# Patient Record
Sex: FEMALE | Race: WHITE | NOT HISPANIC OR LATINO | Employment: OTHER | URBAN - METROPOLITAN AREA
[De-identification: names, ages, dates, MRNs, and addresses within clinical notes are randomized per-mention and may not be internally consistent; named-entity substitution may affect disease eponyms.]

---

## 2025-02-04 DIAGNOSIS — M21.622 BUNIONETTE OF LEFT FOOT: ICD-10-CM

## 2025-02-26 ENCOUNTER — PRE-ADMISSION TESTING (OUTPATIENT)
Dept: PREADMISSION TESTING | Facility: HOSPITAL | Age: 75
End: 2025-02-26
Payer: MEDICARE

## 2025-02-26 VITALS
TEMPERATURE: 98.4 F | DIASTOLIC BLOOD PRESSURE: 93 MMHG | RESPIRATION RATE: 20 BRPM | HEIGHT: 62 IN | SYSTOLIC BLOOD PRESSURE: 162 MMHG | WEIGHT: 177.25 LBS | BODY MASS INDEX: 32.62 KG/M2 | OXYGEN SATURATION: 96 % | HEART RATE: 61 BPM

## 2025-02-26 DIAGNOSIS — M21.372 LEFT FOOT DROP: ICD-10-CM

## 2025-02-26 DIAGNOSIS — G89.29 CHRONIC PAIN IN LEFT FOOT: ICD-10-CM

## 2025-02-26 DIAGNOSIS — M21.622 BUNIONETTE OF LEFT FOOT: ICD-10-CM

## 2025-02-26 DIAGNOSIS — I10 PRIMARY HYPERTENSION: ICD-10-CM

## 2025-02-26 DIAGNOSIS — Z01.818 PREOP TESTING: Primary | ICD-10-CM

## 2025-02-26 DIAGNOSIS — M79.672 CHRONIC PAIN IN LEFT FOOT: ICD-10-CM

## 2025-02-26 LAB
ALBUMIN SERPL BCP-MCNC: 4.5 G/DL (ref 3.4–5)
ALP SERPL-CCNC: 58 U/L (ref 33–136)
ALT SERPL W P-5'-P-CCNC: 18 U/L (ref 7–45)
AMPHETAMINES UR QL SCN: NORMAL
ANION GAP SERPL CALC-SCNC: 12 MMOL/L (ref 10–20)
AST SERPL W P-5'-P-CCNC: 22 U/L (ref 9–39)
BARBITURATES UR QL SCN: NORMAL
BASOPHILS # BLD AUTO: 0.05 X10*3/UL (ref 0–0.1)
BASOPHILS NFR BLD AUTO: 1 %
BENZODIAZ UR QL SCN: NORMAL
BILIRUB SERPL-MCNC: 0.4 MG/DL (ref 0–1.2)
BUN SERPL-MCNC: 12 MG/DL (ref 6–23)
BZE UR QL SCN: NORMAL
CALCIUM SERPL-MCNC: 9.6 MG/DL (ref 8.6–10.3)
CANNABINOIDS UR QL SCN: NORMAL
CHLORIDE SERPL-SCNC: 105 MMOL/L (ref 98–107)
CO2 SERPL-SCNC: 27 MMOL/L (ref 21–32)
CREAT SERPL-MCNC: 0.75 MG/DL (ref 0.5–1.05)
EGFRCR SERPLBLD CKD-EPI 2021: 83 ML/MIN/1.73M*2
EOSINOPHIL # BLD AUTO: 0.3 X10*3/UL (ref 0–0.4)
EOSINOPHIL NFR BLD AUTO: 5.8 %
ERYTHROCYTE [DISTWIDTH] IN BLOOD BY AUTOMATED COUNT: 13.4 % (ref 11.5–14.5)
FENTANYL+NORFENTANYL UR QL SCN: NORMAL
GLUCOSE SERPL-MCNC: 88 MG/DL (ref 74–99)
HCT VFR BLD AUTO: 41.3 % (ref 36–46)
HGB BLD-MCNC: 13.7 G/DL (ref 12–16)
IMM GRANULOCYTES # BLD AUTO: 0.01 X10*3/UL (ref 0–0.5)
IMM GRANULOCYTES NFR BLD AUTO: 0.2 % (ref 0–0.9)
LYMPHOCYTES # BLD AUTO: 1.86 X10*3/UL (ref 0.8–3)
LYMPHOCYTES NFR BLD AUTO: 36.3 %
MCH RBC QN AUTO: 32 PG (ref 26–34)
MCHC RBC AUTO-ENTMCNC: 33.2 G/DL (ref 32–36)
MCV RBC AUTO: 97 FL (ref 80–100)
METHADONE UR QL SCN: NORMAL
MONOCYTES # BLD AUTO: 0.41 X10*3/UL (ref 0.05–0.8)
MONOCYTES NFR BLD AUTO: 8 %
NEUTROPHILS # BLD AUTO: 2.5 X10*3/UL (ref 1.6–5.5)
NEUTROPHILS NFR BLD AUTO: 48.7 %
NRBC BLD-RTO: 0 /100 WBCS (ref 0–0)
OPIATES UR QL SCN: NORMAL
OXYCODONE+OXYMORPHONE UR QL SCN: NORMAL
PCP UR QL SCN: NORMAL
PLATELET # BLD AUTO: 223 X10*3/UL (ref 150–450)
POTASSIUM SERPL-SCNC: 4.2 MMOL/L (ref 3.5–5.3)
PROT SERPL-MCNC: 7.2 G/DL (ref 6.4–8.2)
RBC # BLD AUTO: 4.28 X10*6/UL (ref 4–5.2)
SODIUM SERPL-SCNC: 140 MMOL/L (ref 136–145)
WBC # BLD AUTO: 5.1 X10*3/UL (ref 4.4–11.3)

## 2025-02-26 PROCEDURE — 93005 ELECTROCARDIOGRAM TRACING: CPT

## 2025-02-26 PROCEDURE — 85025 COMPLETE CBC W/AUTO DIFF WBC: CPT | Performed by: PODIATRIST

## 2025-02-26 PROCEDURE — 84075 ASSAY ALKALINE PHOSPHATASE: CPT | Performed by: PODIATRIST

## 2025-02-26 PROCEDURE — 80307 DRUG TEST PRSMV CHEM ANLYZR: CPT

## 2025-02-26 PROCEDURE — 87081 CULTURE SCREEN ONLY: CPT | Mod: PARLAB

## 2025-02-26 PROCEDURE — 99204 OFFICE O/P NEW MOD 45 MIN: CPT | Performed by: NURSE PRACTITIONER

## 2025-02-26 RX ORDER — METFORMIN HYDROCHLORIDE 500 MG/1
500 TABLET ORAL
COMMUNITY

## 2025-02-26 RX ORDER — IRON POLYSACCHARIDE COMPLEX 150 MG
150 CAPSULE ORAL 3 TIMES WEEKLY
COMMUNITY

## 2025-02-26 RX ORDER — CYANOCOBALAMIN 1000 UG/ML
1000 INJECTION, SOLUTION INTRAMUSCULAR; SUBCUTANEOUS
COMMUNITY

## 2025-02-26 RX ORDER — OMEPRAZOLE 40 MG/1
40 CAPSULE, DELAYED RELEASE ORAL DAILY
COMMUNITY

## 2025-02-26 RX ORDER — VITAMIN B COMPLEX
1 CAPSULE ORAL DAILY
COMMUNITY

## 2025-02-26 RX ORDER — CHLORHEXIDINE GLUCONATE 40 MG/ML
SOLUTION TOPICAL DAILY
Qty: 118 ML | Refills: 0 | Status: SHIPPED | OUTPATIENT
Start: 2025-02-26 | End: 2025-03-03

## 2025-02-26 RX ORDER — DOXAZOSIN 2 MG/1
2 TABLET ORAL 2 TIMES DAILY
COMMUNITY

## 2025-02-26 RX ORDER — LECITHIN 1200 MG
1000 CAPSULE ORAL DAILY
COMMUNITY

## 2025-02-26 RX ORDER — CHLORHEXIDINE GLUCONATE ORAL RINSE 1.2 MG/ML
15 SOLUTION DENTAL DAILY
Qty: 30 ML | Refills: 0 | Status: SHIPPED | OUTPATIENT
Start: 2025-02-26 | End: 2025-02-28

## 2025-02-26 RX ORDER — SOLIFENACIN SUCCINATE 10 MG/1
20 TABLET, FILM COATED ORAL DAILY
COMMUNITY

## 2025-02-26 RX ORDER — POTASSIUM CHLORIDE 20 MEQ/1
20 TABLET, EXTENDED RELEASE ORAL 2 TIMES DAILY
COMMUNITY

## 2025-02-26 RX ORDER — DEXTROMETHORPHAN HYDROBROMIDE, GUAIFENESIN 5; 100 MG/5ML; MG/5ML
1000 LIQUID ORAL 2 TIMES DAILY
COMMUNITY

## 2025-02-26 ASSESSMENT — DUKE ACTIVITY SCORE INDEX (DASI)
CAN YOU HAVE SEXUAL RELATIONS: YES
CAN YOU WALK INDOORS, SUCH AS AROUND YOUR HOUSE: YES
CAN YOU DO HEAVY WORK AROUND THE HOUSE LIKE SCRUBBING FLOORS OR LIFTING AND MOVING HEAVY FURNITURE: NO
CAN YOU WALK A BLOCK OR TWO ON LEVEL GROUND: YES
CAN YOU RUN A SHORT DISTANCE: NO
DASI METS SCORE: 5.3
CAN YOU PARTICIPATE IN MODERATE RECREATIONAL ACTIVITIES LIKE GOLF, BOWLING, DANCING, DOUBLES TENNIS OR THROWING A BASEBALL OR FOOTBALL: NO
TOTAL_SCORE: 20.7
CAN YOU DO LIGHT WORK AROUND THE HOUSE LIKE DUSTING OR WASHING DISHES: YES
CAN YOU CLIMB A FLIGHT OF STAIRS OR WALK UP A HILL: YES
CAN YOU DO YARD WORK LIKE RAKING LEAVES, WEEDING OR PUSHING A MOWER: NO
CAN YOU PARTICIPATE IN STRENOUS SPORTS LIKE SWIMMING, SINGLES TENNIS, FOOTBALL, BASKETBALL, OR SKIING: NO
CAN YOU DO MODERATE WORK AROUND THE HOUSE LIKE VACUUMING, SWEEPING FLOORS OR CARRYING GROCERIES: NO
CAN YOU TAKE CARE OF YOURSELF (EAT, DRESS, BATHE, OR USE TOILET): YES

## 2025-02-26 ASSESSMENT — PAIN SCALES - GENERAL: PAINLEVEL_OUTOF10: 0 - NO PAIN

## 2025-02-26 ASSESSMENT — PAIN - FUNCTIONAL ASSESSMENT: PAIN_FUNCTIONAL_ASSESSMENT: 0-10

## 2025-02-26 NOTE — PREPROCEDURE INSTRUCTIONS
Medication List            Accurate as of February 26, 2025  3:36 PM. Always use your most recent med list.                acetaminophen 650 mg ER tablet  Commonly known as: Tylenol 8 HOUR  Medication Adjustments for Surgery: Take/Use as prescribed     apple cider vinegar 600 mg capsule  Additional Medication Adjustments for Surgery: Take last dose 7 days before surgery     b complex vitamins capsule  Additional Medication Adjustments for Surgery: Take last dose 7 days before surgery     CALTRATE 600 ORAL  Additional Medication Adjustments for Surgery: Take last dose 7 days before surgery     * chlorhexidine 0.12 % solution  Commonly known as: Peridex  Use 15 mL in the mouth or throat once daily for 2 doses. Swish and Spit day before surgery and again morning on day of surgery.  Medication Adjustments for Surgery: Take/Use as prescribed     * chlorhexidine 4 % external liquid  Commonly known as: Hibiclens  Apply topically once daily for 5 days. Wash daily for 5 days prior to surgery with day 5 being morning of surgery.  Medication Adjustments for Surgery: Take/Use as prescribed     cyanocobalamin 1,000 mcg/mL injection  Commonly known as: Vitamin B-12  Additional Medication Adjustments for Surgery: Take last dose 7 days before surgery     doxazosin 2 mg tablet  Commonly known as: Cardura  Medication Adjustments for Surgery: Take/Use as prescribed     iron polysaccharides 150 mg iron capsule  Commonly known as: Nu-Iron,Niferex  Additional Medication Adjustments for Surgery: Take last dose 7 days before surgery     metFORMIN 500 mg tablet  Commonly known as: Glucophage  Medication Adjustments for Surgery: Do Not take on the morning of surgery     multivitamin with minerals iron-free  Commonly known as: Centrum Silver  Additional Medication Adjustments for Surgery: Take last dose 7 days before surgery     omeprazole 40 mg DR capsule  Commonly known as: PriLOSEC  Medication Adjustments for Surgery: Take/Use as  prescribed     OSTEO BI-FLEX ORAL  Additional Medication Adjustments for Surgery: Take last dose 7 days before surgery     potassium chloride CR 20 mEq ER tablet  Commonly known as: Klor-Con M20  Medication Adjustments for Surgery: Do Not take on the morning of surgery     solifenacin 10 mg tablet  Commonly known as: VESIcare  Medication Adjustments for Surgery: Do Not take on the morning of surgery     TUMERSAID ORAL  Additional Medication Adjustments for Surgery: Take last dose 7 days before surgery           * This list has 2 medication(s) that are the same as other medications prescribed for you. Read the directions carefully, and ask your doctor or other care provider to review them with you.                PRE-OPERATIVE INSTRUCTIONS FOR SURGERY    *Do not eat anything after midnight the night of surgery.  This includes food of any kind (including hard candy, cough drops, mints).   You may have up to 13 ounces of clear liquid  until TWO hours prior to your scheduled surgery time, Clear liquids include water, black tea/coffee, (no milk or cream) apple juice and electrolyte drinks (GATORADE).  You may chew gum until TWO hours prior you your surgery/procedure.     *ERAS protocol: follow as instructed.  DO not drink an additional 13 ounces as noted above.        *One of our staff members will call you ONE business day before your surgery, between 11 am-2 pm to let you know the time to arrive.    If you have not received a call by 2 pm, call [phone]    *When you arrive at the hospital-->GO TO Registration on the ground floor    *Stop smoking 24 hours prior to surgery.  No Marijuana, CBD Oil or Vaping for 48 hours    *No alcohol 24 hours prior to surgery    *You will need a responsible adult to drive you home    -No acrylic nails or nail polish on at least one fingernail, NO polish on toes for foot surgery    -You may be asked to remove your dentures, partial plate, eyeglasses or contact lenses before going to  surgery.  Please bring a case for these items.    -Body piercings need to be removed.  Jewelry and valuables should be left at home.    -Put on loose,  comfortable, clean clothing, that will accommodate bandages        What is a home antibacterial shower?  This shower is a way of cleaning the skin with a germ killing solution before surgery.  The solution contains chlorhexidine, commonly known as CHG.  CHG is a skin cleanser with germ killing ability.  Let your doctor know if you are allergic to chlorhexidine.    Why do I need to take a preoperative antibacterial shower?  Skin is not sterile.  It is best to try to make your skin as free of germs as possible before surgery.  Proper cleansing with a germ killing soap before surgery can lower the number of germs on your skin.  This helps to reduce the risk of infection at the surgical site.  Following the instructions listed below will help you prepare your skin for surgery.      How do I use the solution?    Steps: Begin using your CHG soap 5 days before your surgery on __________________.    *First, wash and rinse your hair using the CHG soap.  Keep CHG soap away from ear canals and eyes.   Rinse completely, do not condition.  Hair extensions should be removed.    *Wash your face with your normal soap and rinse.   *Apply the CHG solution to a clean wet washcloth.  Turn the water off or move away from the water spray to avoid premature rinsing of the CHG soap as you are applying.  Firmly lather your entire body from the neck down.  Do not use on your face.    *Pay special attention to the area(s) where your incision(s) will be located unless they are on your face.  Avoid scrubbing your skin too hard.  The important part is to have the CHG soap sit on your skin for 3 minutes.   *When the 3 minutes are up, turn on the water and rinse the CHG solution off your body completely.  *Do not wash with regular soap after you  have  used the CHG soap solution.  *Pat  yourself  dry with a clean, freshly laundered towel.  *Do not apply powders, deodorants or lotions.  *Dress in clean freshly laundered night clothes.    *Be sure to sleep with clean freshly laundered sheets.    *Be aware the CHG will cause stains on fabrics; if you wash them with bleach after use.  Rinse your washcloth and other linens that have contact with CHG completely.  Use only non-chlorine detergents to launder the items  used.  *The morning of surgery is the fifth day.  Repeat the above steps and dress in clean comfortable clothing.     What is oral/dental rinse?  It is mouthwash.  It is a way of cleaning the he mouth with a germ-killing solution before your surgery.  The solution contains chlorhexidine, commonly known as CHG.  It is used inside the mouth to kill a bacteria known as Staphylococcus aureus.  Let your doctor know if you are allergic to Chlorhexidine.    Why do I need to use CHG oral/ dental rinse?  The CHG oral/dental rinse helps to kill bacteria in your mouth know as Staphylococcus aureus.  This reduces the risk of infection at the surgical site.    Using your CHG oral/dental rinse    STEPS:    Use your CHG oral/dental rinse after you brush your teeth the night before (at bedtime) and the morning of your surgery.  Follow all the directions on your prescription label.  *Use the cap on the container to measure 15 ml  *Swish (gargle if you can) the mouthwash in your mouth for at least 30 seconds, (do not swallow) and spit out  *After you use your CHG rinse, do not rinse your mouth with water, drink or eat.  Please refer to the prescription label for the appropriate time to resume oral intake.    What side effects might I have using the CHG oral/dental rinse?  CHG rinse will stick you plaque on the teeth.  Brush and floss just before use.   Teeth brushing will help avoid staining of the plaque during  use.  Teeth brushing will help avoid staining of plaque during  use.    Who should I contact if I have  questions about the CHG oral/dental rinse and or CHG soap?  Please call [facility], Pre-Admission testing at [phone] if you have any questions.    What you may be asked to bring to surgery:  Phot ID and insurance information                  NPO Instructions:    Do not eat any food after midnight the night before your surgery/procedure.  You may have clear liquids until TWO hours before surgery/procedure. This includes water, black tea/coffee, (no milk or cream) apple juice and electrolyte drinks (Gatorade).    Additional Instructions:     Day of Surgery:  Review your medication instructions, take indicated medications  You may have clear liquids until TWO hours before surgery/procedure.  This includes water, black tea/coffee, (no milk or cream) apple juice and electrolyte drinks (Gatorade)  Wear  comfortable loose fitting clothing  Do not use moisturizers, creams, lotions or perfume

## 2025-02-26 NOTE — PREPROCEDURE INSTRUCTIONS
Medication List            Accurate as of February 26, 2025  3:32 PM. Always use your most recent med list.                acetaminophen 650 mg ER tablet  Commonly known as: Tylenol 8 HOUR  Medication Adjustments for Surgery: Take/Use as prescribed     apple cider vinegar 600 mg capsule  Additional Medication Adjustments for Surgery: Take last dose 7 days before surgery     b complex vitamins capsule  Additional Medication Adjustments for Surgery: Take last dose 7 days before surgery     CALTRATE 600 ORAL  Additional Medication Adjustments for Surgery: Take last dose 7 days before surgery     * chlorhexidine 0.12 % solution  Commonly known as: Peridex  Use 15 mL in the mouth or throat once daily for 2 doses. Swish and Spit day before surgery and again morning on day of surgery.  Medication Adjustments for Surgery: Take/Use as prescribed     * chlorhexidine 4 % external liquid  Commonly known as: Hibiclens  Apply topically once daily for 5 days. Wash daily for 5 days prior to surgery with day 5 being morning of surgery.  Medication Adjustments for Surgery: Take/Use as prescribed     cyanocobalamin 1,000 mcg/mL injection  Commonly known as: Vitamin B-12  Additional Medication Adjustments for Surgery: Take last dose 7 days before surgery     doxazosin 2 mg tablet  Commonly known as: Cardura  Medication Adjustments for Surgery: Take/Use as prescribed     iron polysaccharides 150 mg iron capsule  Commonly known as: Nu-Iron,Niferex  Additional Medication Adjustments for Surgery: Take last dose 7 days before surgery     metFORMIN 500 mg tablet  Commonly known as: Glucophage  Medication Adjustments for Surgery: Do Not take on the morning of surgery     multivitamin with minerals iron-free  Commonly known as: Centrum Silver  Additional Medication Adjustments for Surgery: Take last dose 7 days before surgery     omeprazole 40 mg DR capsule  Commonly known as: PriLOSEC  Medication Adjustments for Surgery: Take/Use as  prescribed     OSTEO BI-FLEX ORAL  Additional Medication Adjustments for Surgery: Take last dose 7 days before surgery     potassium chloride CR 20 mEq ER tablet  Commonly known as: Klor-Con M20  Medication Adjustments for Surgery: Do Not take on the morning of surgery     solifenacin 10 mg tablet  Commonly known as: VESIcare  Medication Adjustments for Surgery: Do Not take on the morning of surgery     TUMERSAID ORAL  Additional Medication Adjustments for Surgery: Take last dose 7 days before surgery           * This list has 2 medication(s) that are the same as other medications prescribed for you. Read the directions carefully, and ask your doctor or other care provider to review them with you.                PRE-OPERATIVE INSTRUCTIONS FOR SURGERY    *Do not eat anything after midnight the night of surgery.  This includes food of any kind (including hard candy, cough drops, mints).   You may have up to 13 ounces of clear liquid  until TWO hours prior to your scheduled surgery time, .  Clear liquids include water, black tea/coffee, (no milk or cream) apple juice and electrolyte drinks (GATORADE).  You may chew gum until TWO hours prior you your surgery/procedure.     *ERAS protocol: follow as instructed.  DO not drink an additional 13 ounces as noted above.        *One of our staff members will call you ONE business day before your surgery, between 11am-2 pm to let you know the time to arrive.    If you have not received a call by 2 pm, call [phone]    *When you arrive at the hospital-->GO TO Registration on the ground floor    *Stop smoking 24 hours prior to surgery.  No Marijuana, CBD Oil or Vaping for 48 hours    *No alcohol 24 hours prior to surgery    *You will need a responsible adult to drive you home    -No acrylic nails or nail polish on at least one fingernail, NO polish on toes for foot surgery    -You may be asked to remove your dentures, partial plate, eyeglasses or contact lenses before going to  surgery.  Please bring a case for these items.    -Body piercings need to be removed.  Jewelry and valuables should be left at home.    -Put on loose,  comfortable, clean clothing, that will accommodate bandages      What you may be asked to bring to surgery  Insurance information and photo ID                         NPO Instructions:    Do not eat any food after midnight the night before your surgery/procedure.  You may have clear liquids until TWO hours before surgery/procedure. This includes water, black tea/coffee, (no milk or cream) apple juice and electrolyte drinks (Gatorade).    Additional Instructions:     Day of Surgery:  Review your medication instructions, take indicated medications  You may have clear liquids until TWO hours before surgery/procedure.  This includes water, black tea/coffee, (no milk or cream) apple juice and electrolyte drinks (Gatorade)  You may chew gum up to TWO hours before your surgery/procedure  Wear  comfortable loose fitting clothing  Do not use moisturizers, creams, lotions or perfume  All jewelry and valuables should be left at home

## 2025-02-26 NOTE — CPM/PAT H&P
CPM/PAT Evaluation       Name: Krys Maher (Krys Maher)  /Age: 1950/75 y.o.     In-Person       Chief Complaint: PAT for planned Left foot surgery    75 yr old female w/PHx of ERVIN (no CPAP), HTN, Left breast CA (s/p Left mastectomy w/radiation ), anemia, GERD, OA, osteoporosis, obesity (BMI 32), Left foot drop and Left tailors bunionette/Left toe pain referred to PAT for planned Left foot deformity reconstruction w/mini c-arm w/Dr Luis Armando Martino on 3/7/2025    Patient reports feeling overall well, denies fever, cough or recent infection. Reports not as active as desired d/t Left foot drop and need to use Rolator; can complete ADLs independently without incident, denies recent falls; denies cardiac or respiratory symptoms. 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 Past surgical hx includes tonsillectomy/adenoidectomy (child), abdominoplasty, knee surgery, gastric bypass (), Left partial mastectomy (), Right shoulder, spine surgery, hysterectomy and trigger fingers; denies past issues with anesthesia, although states one episode after hysterectomy () of slow emergence.       Followed by PCP (Juanjo Penaloza MD) - last visit 2025    Followed by rheumatology (Robbie DE LA O) - last visit 2025; scheduled first dose of reclast after surgery        Past Medical History:   Diagnosis Date    Anemia     Arthritis     Essential (primary) hypertension 2017    Hypertension    GERD (gastroesophageal reflux disease)     Joint pain     Mastitis without abscess 2015    Cellulitis of breast    Neoplasm of unspecified behavior of bladder 2013    Neoplasm of bladder    Other specified disorders of bone density and structure, unspecified site 2015    Osteopenia    Personal history of diseases of the blood and blood-forming organs and certain disorders involving the immune mechanism     History of anemia    Personal history of malignant melanoma of skin      "History of malignant melanoma    Personal history of other diseases of the musculoskeletal system and connective tissue     Personal history of osteoporosis    Personal history of other diseases of the musculoskeletal system and connective tissue     History of degenerative disc disease    Sleep apnea     Type 2 diabetes mellitus        Past Surgical History:   Procedure Laterality Date    CATARACT EXTRACTION      CHOLECYSTECTOMY  10/03/2014    Cholecystectomy    COLONOSCOPY      CYSTOSCOPY  04/07/2015    Diagnostic Cystoscopy    FOOT SURGERY      GASTRIC BYPASS  10/03/2014    Gastric Surgery For Morbid Obesity Bypass With Jigar-en-Y    GASTRIC BYPASS      1995    HYSTERECTOMY  10/03/2014    Hysterectomy    INCISIONAL BREAST BIOPSY  09/01/2015    Incisional Breast Biopsy    JOINT REPLACEMENT      LASIK      LUMBAR FUSION      LUMBAR LAMINECTOMY      MASTECTOMY      OTHER SURGICAL HISTORY  05/02/2013    Excision Of Lesion Forearms    OTHER SURGICAL HISTORY  05/28/2013    Excision Of Lesion Shoulders    OTHER SURGICAL HISTORY  04/07/2015    Complex Bladder Flow Rate Studies    OTHER SURGICAL HISTORY  09/01/2015    Breast Surgery Procedure Detail [place] Node Biopsy    OTHER SURGICAL HISTORY  09/01/2015    Left Breast Partial Mastectomy    TONSILLECTOMY  10/03/2014    Tonsillectomy    UPPER GASTROINTESTINAL ENDOSCOPY         Patient Sexual activity questions deferred to the physician.    No family history on file.    Allergies   Allergen Reactions    Dog Dander Itching    Codeine Other     makes her \"hyper\" and upsets her stomach    Lactated Ringers Unknown     Depleted potassium levels       Prior to Admission medications    Not on File        Review of Systems    Constitutional: no fever, no chills and not feeling poorly.   Eyes: no eyesight problems.   ENT: no hearing loss, no nosebleeds and no sore throat.   Cardiovascular: no chest pain, no palpitations and no extremity edema.   Respiratory: no sob, no wheezing, " "no cough and no sob w/exertion.   Gastrointestinal: negative for abdominal pain, blood in stools or changes in bowel habits   Genitourinary: hx interstitial cystitis   Musculoskeletal: no arthralgias, ambulates independently w/Rolator, Left foot drop, Left ankle/foot brace in use.   Integumentary: negative for lesions, rash or itching.   Neurological: negative for confusion, dizziness or fainting.   Psychiatric: not suicidal, no anxiety and no depression.   All other systems have been reviewed and are negative for complaint.     Physical Exam  Vitals reviewed.   Constitutional:       Appearance: Normal appearance.   HENT:      Head: Normocephalic.      Mouth/Throat:      Mouth: Mucous membranes are moist.   Eyes:      Pupils: Pupils are equal, round, and reactive to light.   Cardiovascular:      Rate and Rhythm: Normal rate and regular rhythm.   Pulmonary:      Effort: Pulmonary effort is normal.      Breath sounds: Normal breath sounds.   Abdominal:      General: Bowel sounds are normal.   Musculoskeletal:         General: Normal range of motion.      Right lower leg: Edema present.      Left lower leg: Edema present.      Comments: Ambulates independently w/Rolator d/t Left Foot drop; currently using ankle/foot brace on Left; bilateral compression stockings in use   Skin:     General: Skin is warm and dry.   Neurological:      Mental Status: She is alert and oriented to person, place, and time.   Psychiatric:         Mood and Affect: Mood normal.          PAT AIRWAY:   Airway:     Mallampati::  I    TM distance::  >3 FB    Neck ROM::  Full  normal        Testing/Diagnostic: CBC, CMP, drug screen, staph/MRSA, ecg    Patient Specialist/PCP: Juanjo Penaloza MD (PCP) 8/21/2025; Robbie DE LA O (rheumatology) 1/17/2025; Jeff Smith MD (urology)    Visit Vitals  BP (!) 162/93   Pulse 61   Temp 36.9 °C (98.4 °F) (Tympanic)   Resp 20   Ht 1.575 m (5' 2\")   Wt 80.4 kg (177 lb 4 oz)   SpO2 96%   BMI 32.42 kg/m² "   Smoking Status Never   BSA 1.88 m²       DASI Risk Score      Flowsheet Row Pre-Admission Testing from 2/26/2025 in [facility]   Can you take care of yourself (eat, dress, bathe, or use toilet)?  2.75 filed at 02/26/2025 1433   Can you walk indoors, such as around your house? 1.75 filed at 02/26/2025 1433   Can you walk a block or two on level ground?  2.75 filed at 02/26/2025 1433   Can you climb a flight of stairs or walk up a hill? 5.5 filed at 02/26/2025 1433   Can you run a short distance? 0 filed at 02/26/2025 1433   Can you do light work around the house like dusting or washing dishes? 2.7 filed at 02/26/2025 1433   Can you do moderate work around the house like vacuuming, sweeping floors or carrying groceries? 0 filed at 02/26/2025 1433   Can you do heavy work around the house like scrubbing floors or lifting and moving heavy furniture?  0 filed at 02/26/2025 1433   Can you do yard work like raking leaves, weeding or pushing a mower? 0 filed at 02/26/2025 1433   Can you have sexual relations? 5.25 filed at 02/26/2025 1433   Can you participate in moderate recreational activities like golf, bowling, dancing, doubles tennis or throwing a baseball or football? 0 filed at 02/26/2025 1433   Can you participate in strenous sports like swimming, singles tennis, football, basketball, or skiing? 0 filed at 02/26/2025 1433   DASI SCORE 20.7 filed at 02/26/2025 1433   METS Score (Will be calculated only when all the questions are answered) 5.3 filed at 02/26/2025 1433          Caprini DVT Assessment    No data to display       Modified Frailty Index    No data to display       CHADS2 Stroke Risk  Current as of 20 minutes ago        N/A 3 to 100%: High Risk   2 to < 3%: Medium Risk   0 to < 2%: Low Risk     Last Change: N/A          This score determines the patient's risk of having a stroke if the patient has atrial fibrillation.        This score is not applicable to this patient. Components are not  calculated.          Revised Cardiac Risk Index    No data to display       Apfel Simplified Score    No data to display       Risk Analysis Index Results This Encounter    No data found in the last 10 encounters.       Stop Bang Score      Flowsheet Row Pre-Admission Testing from 2/26/2025 in [facility]   Do you snore loudly? 1 filed at 02/26/2025 1513   Do you often feel tired or fatigued after your sleep? 0 filed at 02/26/2025 1513   Has anyone ever observed you stop breathing in your sleep? 0 filed at 02/26/2025 1513   Do you have or are you being treated for high blood pressure? 1 filed at 02/26/2025 1513   Recent BMI (Calculated) 32.4 filed at 02/26/2025 1513   Is BMI greater than 35 kg/m2? 0=No filed at 02/26/2025 1513   Age older than 50 years old? 1=Yes filed at 02/26/2025 1513   Is your neck circumference greater than 17 inches (Male) or 16 inches (Female)? 0 filed at 02/26/2025 1513   Gender - Male 0=No filed at 02/26/2025 1513   STOP-BANG Total Score 3 filed at 02/26/2025 1513          Prodigy: High Risk  Total Score: 12              Prodigy Age Score           ARISCAT Score for Postoperative Pulmonary Complications      Flowsheet Row Pre-Admission Testing from 2/26/2025 in [facility]   Age Calculated Score 3 filed at 02/26/2025 1556   Preoperative SpO2 0 filed at 02/26/2025 1556   Respiratory infection in the last month Either upper or lower (i.e., URI, bronchitis, pneumonia), with fever and antibiotic treatment 0 filed at 02/26/2025 1556   Preoperative anemia (Hgb less than 10 g/dl) 0 filed at 02/26/2025 1556   Surgical incision  0 filed at 02/26/2025 1556   Duration of surgery  0 filed at 02/26/2025 1556   Emergency Procedure  0 filed at 02/26/2025 1556   ARISCAT Total Score  3 filed at 02/26/2025 1556          Zaidi Perioperative Risk for Myocardial Infarction or Cardiac Arrest (ANTHONY)      Flowsheet Row Pre-Admission Testing from 2/26/2025 in [facility]    Calculated Age Score 1.52 filed at 02/26/2025 1557   Functional Status  0.65 filed at 02/26/2025 1557   ASA Class  -3.29 filed at 02/26/2025 1557   Creatinine 0 filed at 02/26/2025 1557   Type of Procedure  0.80 filed at 02/26/2025 1557   ANTHONY Total Score  -5.57 filed at 02/26/2025 1557   ANTHONY % 0.38 filed at 02/26/2025 1557            Assessment and Plan:     # ERVIN - states sleep study many years ago; no CPAP use  # HTN - continue doxazosin   # Left breast CA - s/p lumpectomy (2014)  # Anemia - managed w/iron supplements/diet, iron infusion 11/2024; Hemoglobin 13.1 (12/2/2024)  # DM II - hold metformin day of surgery; A1c 6.1 (9/13/2024)  # GERD - continue omeprazole  # OA - managed w/suppliments  # Osteoporosis - has first reclast infusion scheduled after surgery  # Obesity (BMI 32) - diet/activity discussed/encouraged; s/p gastric bypass  # Left foot drop - ankle/foot brace in use, ambulates independently w/Rolator, recently completed PT session (12/2024)  # Left Tailor's bunionette/Left toe pain - Left foot deformity reconstruction w/mini c-arm w/Dr Luis Armando Martino on 3/7/2025    Ecg complete 2/26/2025 - NSR, moderate voltage criteria for LVH, may be normal variant (64 bpm)   Medical hx, Allergies, VS and Labs reviewed  Medications addressed w/pre-op instructions provided

## 2025-02-26 NOTE — H&P (VIEW-ONLY)
CPM/PAT Evaluation       Name: Krys Maher (Krys Maher)  /Age: 1950/75 y.o.     In-Person       Chief Complaint: PAT for planned Left foot surgery    75 yr old female w/PHx of ERVIN (no CPAP), HTN, Left breast CA (s/p Left mastectomy w/radiation ), anemia, GERD, OA, osteoporosis, obesity (BMI 32), Left foot drop and Left tailors bunionette/Left toe pain referred to PAT for planned Left foot deformity reconstruction w/mini c-arm w/Dr Luis Armando Martino on 3/7/2025    Patient reports feeling overall well, denies fever, cough or recent infection. Reports not as active as desired d/t Left foot drop and need to use Rolator; can complete ADLs independently without incident, denies recent falls; denies cardiac or respiratory symptoms. 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 Past surgical hx includes tonsillectomy/adenoidectomy (child), abdominoplasty, knee surgery, gastric bypass (), Left partial mastectomy (), Right shoulder, spine surgery, hysterectomy and trigger fingers; denies past issues with anesthesia, although states one episode after hysterectomy () of slow emergence.       Followed by PCP (Juanjo Penaloza MD) - last visit 2025    Followed by rheumatology (Robbie DE LA O) - last visit 2025; scheduled first dose of reclast after surgery        Past Medical History:   Diagnosis Date    Anemia     Arthritis     Essential (primary) hypertension 2017    Hypertension    GERD (gastroesophageal reflux disease)     Joint pain     Mastitis without abscess 2015    Cellulitis of breast    Neoplasm of unspecified behavior of bladder 2013    Neoplasm of bladder    Other specified disorders of bone density and structure, unspecified site 2015    Osteopenia    Personal history of diseases of the blood and blood-forming organs and certain disorders involving the immune mechanism     History of anemia    Personal history of malignant melanoma of skin      "History of malignant melanoma    Personal history of other diseases of the musculoskeletal system and connective tissue     Personal history of osteoporosis    Personal history of other diseases of the musculoskeletal system and connective tissue     History of degenerative disc disease    Sleep apnea     Type 2 diabetes mellitus        Past Surgical History:   Procedure Laterality Date    CATARACT EXTRACTION      CHOLECYSTECTOMY  10/03/2014    Cholecystectomy    COLONOSCOPY      CYSTOSCOPY  04/07/2015    Diagnostic Cystoscopy    FOOT SURGERY      GASTRIC BYPASS  10/03/2014    Gastric Surgery For Morbid Obesity Bypass With Jigar-en-Y    GASTRIC BYPASS      1995    HYSTERECTOMY  10/03/2014    Hysterectomy    INCISIONAL BREAST BIOPSY  09/01/2015    Incisional Breast Biopsy    JOINT REPLACEMENT      LASIK      LUMBAR FUSION      LUMBAR LAMINECTOMY      MASTECTOMY      OTHER SURGICAL HISTORY  05/02/2013    Excision Of Lesion Forearms    OTHER SURGICAL HISTORY  05/28/2013    Excision Of Lesion Shoulders    OTHER SURGICAL HISTORY  04/07/2015    Complex Bladder Flow Rate Studies    OTHER SURGICAL HISTORY  09/01/2015    Breast Surgery Procedure Detail [place] Node Biopsy    OTHER SURGICAL HISTORY  09/01/2015    Left Breast Partial Mastectomy    TONSILLECTOMY  10/03/2014    Tonsillectomy    UPPER GASTROINTESTINAL ENDOSCOPY         Patient Sexual activity questions deferred to the physician.    No family history on file.    Allergies   Allergen Reactions    Dog Dander Itching    Codeine Other     makes her \"hyper\" and upsets her stomach    Lactated Ringers Unknown     Depleted potassium levels       Prior to Admission medications    Not on File        Review of Systems    Constitutional: no fever, no chills and not feeling poorly.   Eyes: no eyesight problems.   ENT: no hearing loss, no nosebleeds and no sore throat.   Cardiovascular: no chest pain, no palpitations and no extremity edema.   Respiratory: no sob, no wheezing, " "no cough and no sob w/exertion.   Gastrointestinal: negative for abdominal pain, blood in stools or changes in bowel habits   Genitourinary: hx interstitial cystitis   Musculoskeletal: no arthralgias, ambulates independently w/Rolator, Left foot drop, Left ankle/foot brace in use.   Integumentary: negative for lesions, rash or itching.   Neurological: negative for confusion, dizziness or fainting.   Psychiatric: not suicidal, no anxiety and no depression.   All other systems have been reviewed and are negative for complaint.     Physical Exam  Vitals reviewed.   Constitutional:       Appearance: Normal appearance.   HENT:      Head: Normocephalic.      Mouth/Throat:      Mouth: Mucous membranes are moist.   Eyes:      Pupils: Pupils are equal, round, and reactive to light.   Cardiovascular:      Rate and Rhythm: Normal rate and regular rhythm.   Pulmonary:      Effort: Pulmonary effort is normal.      Breath sounds: Normal breath sounds.   Abdominal:      General: Bowel sounds are normal.   Musculoskeletal:         General: Normal range of motion.      Right lower leg: Edema present.      Left lower leg: Edema present.      Comments: Ambulates independently w/Rolator d/t Left Foot drop; currently using ankle/foot brace on Left; bilateral compression stockings in use   Skin:     General: Skin is warm and dry.   Neurological:      Mental Status: She is alert and oriented to person, place, and time.   Psychiatric:         Mood and Affect: Mood normal.          PAT AIRWAY:   Airway:     Mallampati::  I    TM distance::  >3 FB    Neck ROM::  Full  normal        Testing/Diagnostic: CBC, CMP, drug screen, staph/MRSA, ecg    Patient Specialist/PCP: Juanjo Penaloza MD (PCP) 8/21/2025; Robbie DE LA O (rheumatology) 1/17/2025; Jeff Smith MD (urology)    Visit Vitals  BP (!) 162/93   Pulse 61   Temp 36.9 °C (98.4 °F) (Tympanic)   Resp 20   Ht 1.575 m (5' 2\")   Wt 80.4 kg (177 lb 4 oz)   SpO2 96%   BMI 32.42 kg/m² "   Smoking Status Never   BSA 1.88 m²       DASI Risk Score      Flowsheet Row Pre-Admission Testing from 2/26/2025 in [facility]   Can you take care of yourself (eat, dress, bathe, or use toilet)?  2.75 filed at 02/26/2025 1433   Can you walk indoors, such as around your house? 1.75 filed at 02/26/2025 1433   Can you walk a block or two on level ground?  2.75 filed at 02/26/2025 1433   Can you climb a flight of stairs or walk up a hill? 5.5 filed at 02/26/2025 1433   Can you run a short distance? 0 filed at 02/26/2025 1433   Can you do light work around the house like dusting or washing dishes? 2.7 filed at 02/26/2025 1433   Can you do moderate work around the house like vacuuming, sweeping floors or carrying groceries? 0 filed at 02/26/2025 1433   Can you do heavy work around the house like scrubbing floors or lifting and moving heavy furniture?  0 filed at 02/26/2025 1433   Can you do yard work like raking leaves, weeding or pushing a mower? 0 filed at 02/26/2025 1433   Can you have sexual relations? 5.25 filed at 02/26/2025 1433   Can you participate in moderate recreational activities like golf, bowling, dancing, doubles tennis or throwing a baseball or football? 0 filed at 02/26/2025 1433   Can you participate in strenous sports like swimming, singles tennis, football, basketball, or skiing? 0 filed at 02/26/2025 1433   DASI SCORE 20.7 filed at 02/26/2025 1433   METS Score (Will be calculated only when all the questions are answered) 5.3 filed at 02/26/2025 1433          Caprini DVT Assessment    No data to display       Modified Frailty Index    No data to display       CHADS2 Stroke Risk  Current as of 20 minutes ago        N/A 3 to 100%: High Risk   2 to < 3%: Medium Risk   0 to < 2%: Low Risk     Last Change: N/A          This score determines the patient's risk of having a stroke if the patient has atrial fibrillation.        This score is not applicable to this patient. Components are not  calculated.          Revised Cardiac Risk Index    No data to display       Apfel Simplified Score    No data to display       Risk Analysis Index Results This Encounter    No data found in the last 10 encounters.       Stop Bang Score      Flowsheet Row Pre-Admission Testing from 2/26/2025 in [facility]   Do you snore loudly? 1 filed at 02/26/2025 1513   Do you often feel tired or fatigued after your sleep? 0 filed at 02/26/2025 1513   Has anyone ever observed you stop breathing in your sleep? 0 filed at 02/26/2025 1513   Do you have or are you being treated for high blood pressure? 1 filed at 02/26/2025 1513   Recent BMI (Calculated) 32.4 filed at 02/26/2025 1513   Is BMI greater than 35 kg/m2? 0=No filed at 02/26/2025 1513   Age older than 50 years old? 1=Yes filed at 02/26/2025 1513   Is your neck circumference greater than 17 inches (Male) or 16 inches (Female)? 0 filed at 02/26/2025 1513   Gender - Male 0=No filed at 02/26/2025 1513   STOP-BANG Total Score 3 filed at 02/26/2025 1513          Prodigy: High Risk  Total Score: 12              Prodigy Age Score           ARISCAT Score for Postoperative Pulmonary Complications      Flowsheet Row Pre-Admission Testing from 2/26/2025 in [facility]   Age Calculated Score 3 filed at 02/26/2025 1556   Preoperative SpO2 0 filed at 02/26/2025 1556   Respiratory infection in the last month Either upper or lower (i.e., URI, bronchitis, pneumonia), with fever and antibiotic treatment 0 filed at 02/26/2025 1556   Preoperative anemia (Hgb less than 10 g/dl) 0 filed at 02/26/2025 1556   Surgical incision  0 filed at 02/26/2025 1556   Duration of surgery  0 filed at 02/26/2025 1556   Emergency Procedure  0 filed at 02/26/2025 1556   ARISCAT Total Score  3 filed at 02/26/2025 1556          Zaidi Perioperative Risk for Myocardial Infarction or Cardiac Arrest (ANTHONY)      Flowsheet Row Pre-Admission Testing from 2/26/2025 in [facility]    Calculated Age Score 1.52 filed at 02/26/2025 1557   Functional Status  0.65 filed at 02/26/2025 1557   ASA Class  -3.29 filed at 02/26/2025 1557   Creatinine 0 filed at 02/26/2025 1557   Type of Procedure  0.80 filed at 02/26/2025 1557   ANTHONY Total Score  -5.57 filed at 02/26/2025 1557   ANTHONY % 0.38 filed at 02/26/2025 1557            Assessment and Plan:     # ERVIN - states sleep study many years ago; no CPAP use  # HTN - continue doxazosin   # Left breast CA - s/p lumpectomy (2014)  # Anemia - managed w/iron supplements/diet, iron infusion 11/2024; Hemoglobin 13.1 (12/2/2024)  # DM II - hold metformin day of surgery; A1c 6.1 (9/13/2024)  # GERD - continue omeprazole  # OA - managed w/suppliments  # Osteoporosis - has first reclast infusion scheduled after surgery  # Obesity (BMI 32) - diet/activity discussed/encouraged; s/p gastric bypass  # Left foot drop - ankle/foot brace in use, ambulates independently w/Rolator, recently completed PT session (12/2024)  # Left Tailor's bunionette/Left toe pain - Left foot deformity reconstruction w/mini c-arm w/Dr Luis Armando Martino on 3/7/2025    Ecg complete 2/26/2025 - NSR, moderate voltage criteria for LVH, may be normal variant (64 bpm)   Medical hx, Allergies, VS and Labs reviewed  Medications addressed w/pre-op instructions provided

## 2025-02-27 LAB
ATRIAL RATE: 64 BPM
P AXIS: 49 DEGREES
P OFFSET: 177 MS
P ONSET: 119 MS
PR INTERVAL: 186 MS
Q ONSET: 212 MS
QRS COUNT: 11 BEATS
QRS DURATION: 98 MS
QT INTERVAL: 414 MS
QTC CALCULATION(BAZETT): 427 MS
QTC FREDERICIA: 422 MS
R AXIS: 6 DEGREES
T AXIS: 13 DEGREES
T OFFSET: 419 MS
VENTRICULAR RATE: 64 BPM

## 2025-02-28 LAB — STAPHYLOCOCCUS SPEC CULT: NORMAL

## 2025-03-07 ENCOUNTER — HOSPITAL ENCOUNTER (OUTPATIENT)
Facility: HOSPITAL | Age: 75
Setting detail: OUTPATIENT SURGERY
Discharge: HOME | End: 2025-03-07
Attending: PODIATRIST | Admitting: PODIATRIST
Payer: MEDICARE

## 2025-03-07 ENCOUNTER — ANESTHESIA (OUTPATIENT)
Dept: OPERATING ROOM | Facility: HOSPITAL | Age: 75
End: 2025-03-07
Payer: MEDICARE

## 2025-03-07 ENCOUNTER — APPOINTMENT (OUTPATIENT)
Dept: RADIOLOGY | Facility: HOSPITAL | Age: 75
End: 2025-03-07
Payer: MEDICARE

## 2025-03-07 ENCOUNTER — ANESTHESIA EVENT (OUTPATIENT)
Dept: OPERATING ROOM | Facility: HOSPITAL | Age: 75
End: 2025-03-07
Payer: MEDICARE

## 2025-03-07 VITALS
RESPIRATION RATE: 16 BRPM | DIASTOLIC BLOOD PRESSURE: 59 MMHG | SYSTOLIC BLOOD PRESSURE: 132 MMHG | HEART RATE: 65 BPM | HEIGHT: 62 IN | WEIGHT: 177.25 LBS | OXYGEN SATURATION: 96 % | TEMPERATURE: 97.3 F | BODY MASS INDEX: 32.62 KG/M2

## 2025-03-07 DIAGNOSIS — M21.622 BUNIONETTE OF LEFT FOOT: Primary | ICD-10-CM

## 2025-03-07 DIAGNOSIS — G89.18 POST-OPERATIVE PAIN: ICD-10-CM

## 2025-03-07 DIAGNOSIS — G89.29 CHRONIC PAIN IN LEFT FOOT: ICD-10-CM

## 2025-03-07 DIAGNOSIS — M79.672 CHRONIC PAIN IN LEFT FOOT: ICD-10-CM

## 2025-03-07 PROBLEM — E11.9 TYPE 2 DIABETES MELLITUS WITHOUT COMPLICATION, WITHOUT LONG-TERM CURRENT USE OF INSULIN (MULTI): Status: ACTIVE | Noted: 2023-05-11

## 2025-03-07 PROBLEM — D64.9 ANEMIA: Chronic | Status: ACTIVE | Noted: 2021-11-26

## 2025-03-07 PROBLEM — C50.919 MALIGNANT NEOPLASM OF BREAST: Status: ACTIVE | Noted: 2021-11-26

## 2025-03-07 PROBLEM — F40.240 CLAUSTROPHOBIA: Status: ACTIVE | Noted: 2025-03-07

## 2025-03-07 PROBLEM — I10 BENIGN ESSENTIAL HYPERTENSION: Status: ACTIVE | Noted: 2019-08-29

## 2025-03-07 PROBLEM — E66.811 OBESITY, CLASS I, BMI 30-34.9: Status: ACTIVE | Noted: 2018-12-19

## 2025-03-07 PROBLEM — G62.9 POLYNEUROPATHY: Status: ACTIVE | Noted: 2017-08-25

## 2025-03-07 PROBLEM — Z85.820 HISTORY OF MALIGNANT MELANOMA: Status: ACTIVE | Noted: 2025-03-07

## 2025-03-07 PROBLEM — F41.1 ANXIETY STATE: Status: ACTIVE | Noted: 2019-08-29

## 2025-03-07 PROBLEM — K21.9 GASTROESOPHAGEAL REFLUX DISEASE: Status: ACTIVE | Noted: 2019-08-29

## 2025-03-07 PROBLEM — R53.83 FATIGUE: Status: ACTIVE | Noted: 2025-03-07

## 2025-03-07 LAB — GLUCOSE BLD MANUAL STRIP-MCNC: 99 MG/DL (ref 74–99)

## 2025-03-07 PROCEDURE — 3600000008 HC OR TIME - EACH INCREMENTAL 1 MINUTE - PROCEDURE LEVEL THREE: Performed by: PODIATRIST

## 2025-03-07 PROCEDURE — 82947 ASSAY GLUCOSE BLOOD QUANT: CPT

## 2025-03-07 PROCEDURE — 2500000004 HC RX 250 GENERAL PHARMACY W/ HCPCS (ALT 636 FOR OP/ED): Performed by: ANESTHESIOLOGIST ASSISTANT

## 2025-03-07 PROCEDURE — 2500000004 HC RX 250 GENERAL PHARMACY W/ HCPCS (ALT 636 FOR OP/ED)

## 2025-03-07 PROCEDURE — 3700000002 HC GENERAL ANESTHESIA TIME - EACH INCREMENTAL 1 MINUTE: Performed by: PODIATRIST

## 2025-03-07 PROCEDURE — 7100000009 HC PHASE TWO TIME - INITIAL BASE CHARGE: Performed by: PODIATRIST

## 2025-03-07 PROCEDURE — 2500000004 HC RX 250 GENERAL PHARMACY W/ HCPCS (ALT 636 FOR OP/ED): Performed by: PODIATRIST

## 2025-03-07 PROCEDURE — 7100000010 HC PHASE TWO TIME - EACH INCREMENTAL 1 MINUTE: Performed by: PODIATRIST

## 2025-03-07 PROCEDURE — 2500000005 HC RX 250 GENERAL PHARMACY W/O HCPCS: Performed by: PODIATRIST

## 2025-03-07 PROCEDURE — 3700000001 HC GENERAL ANESTHESIA TIME - INITIAL BASE CHARGE: Performed by: PODIATRIST

## 2025-03-07 PROCEDURE — 2720000007 HC OR 272 NO HCPCS: Performed by: PODIATRIST

## 2025-03-07 PROCEDURE — 3600000003 HC OR TIME - INITIAL BASE CHARGE - PROCEDURE LEVEL THREE: Performed by: PODIATRIST

## 2025-03-07 PROCEDURE — 76000 FLUOROSCOPY <1 HR PHYS/QHP: CPT

## 2025-03-07 RX ORDER — ONDANSETRON HYDROCHLORIDE 2 MG/ML
4 INJECTION, SOLUTION INTRAVENOUS ONCE AS NEEDED
Status: DISCONTINUED | OUTPATIENT
Start: 2025-03-07 | End: 2025-03-07 | Stop reason: HOSPADM

## 2025-03-07 RX ORDER — LIDOCAINE HYDROCHLORIDE 20 MG/ML
INJECTION, SOLUTION INFILTRATION; PERINEURAL AS NEEDED
Status: DISCONTINUED | OUTPATIENT
Start: 2025-03-07 | End: 2025-03-07 | Stop reason: HOSPADM

## 2025-03-07 RX ORDER — LIDOCAINE HCL/PF 100 MG/5ML
SYRINGE (ML) INTRAVENOUS AS NEEDED
Status: DISCONTINUED | OUTPATIENT
Start: 2025-03-07 | End: 2025-03-07

## 2025-03-07 RX ORDER — PROPOFOL 10 MG/ML
INJECTION, EMULSION INTRAVENOUS AS NEEDED
Status: DISCONTINUED | OUTPATIENT
Start: 2025-03-07 | End: 2025-03-07

## 2025-03-07 RX ORDER — ALBUTEROL SULFATE 0.83 MG/ML
2.5 SOLUTION RESPIRATORY (INHALATION) ONCE AS NEEDED
Status: DISCONTINUED | OUTPATIENT
Start: 2025-03-07 | End: 2025-03-07 | Stop reason: HOSPADM

## 2025-03-07 RX ORDER — MEPERIDINE HYDROCHLORIDE 50 MG/ML
12.5 INJECTION INTRAMUSCULAR; INTRAVENOUS; SUBCUTANEOUS EVERY 10 MIN PRN
Status: DISCONTINUED | OUTPATIENT
Start: 2025-03-07 | End: 2025-03-07 | Stop reason: HOSPADM

## 2025-03-07 RX ORDER — BUPIVACAINE HYDROCHLORIDE 5 MG/ML
INJECTION, SOLUTION PERINEURAL AS NEEDED
Status: DISCONTINUED | OUTPATIENT
Start: 2025-03-07 | End: 2025-03-07 | Stop reason: HOSPADM

## 2025-03-07 RX ORDER — SODIUM CHLORIDE 9 MG/ML
100 INJECTION, SOLUTION INTRAVENOUS CONTINUOUS
Status: DISCONTINUED | OUTPATIENT
Start: 2025-03-07 | End: 2025-03-07 | Stop reason: HOSPADM

## 2025-03-07 RX ORDER — OXYCODONE AND ACETAMINOPHEN 5; 325 MG/1; MG/1
1 TABLET ORAL EVERY 6 HOURS PRN
Qty: 28 TABLET | Refills: 0 | Status: CANCELLED | OUTPATIENT
Start: 2025-03-07 | End: 2025-03-14

## 2025-03-07 RX ORDER — DEXMEDETOMIDINE HYDROCHLORIDE 100 UG/ML
INJECTION, SOLUTION INTRAVENOUS AS NEEDED
Status: DISCONTINUED | OUTPATIENT
Start: 2025-03-07 | End: 2025-03-07

## 2025-03-07 RX ORDER — ASCORBIC ACID 250 MG
500 TABLET,CHEWABLE ORAL 2 TIMES DAILY
Qty: 120 TABLET | Refills: 0 | Status: SHIPPED | OUTPATIENT
Start: 2025-03-07 | End: 2025-04-06

## 2025-03-07 RX ORDER — CEFAZOLIN SODIUM 2 G/100ML
2 INJECTION, SOLUTION INTRAVENOUS ONCE
Status: COMPLETED | OUTPATIENT
Start: 2025-03-07 | End: 2025-03-07

## 2025-03-07 RX ORDER — ONDANSETRON HYDROCHLORIDE 2 MG/ML
INJECTION, SOLUTION INTRAVENOUS AS NEEDED
Status: DISCONTINUED | OUTPATIENT
Start: 2025-03-07 | End: 2025-03-07

## 2025-03-07 RX ORDER — IPRATROPIUM BROMIDE 0.5 MG/2.5ML
500 SOLUTION RESPIRATORY (INHALATION) ONCE
Status: DISCONTINUED | OUTPATIENT
Start: 2025-03-07 | End: 2025-03-07 | Stop reason: HOSPADM

## 2025-03-07 RX ORDER — LIDOCAINE HYDROCHLORIDE 10 MG/ML
0.1 INJECTION, SOLUTION INFILTRATION; PERINEURAL ONCE
Status: DISCONTINUED | OUTPATIENT
Start: 2025-03-07 | End: 2025-03-07 | Stop reason: HOSPADM

## 2025-03-07 RX ORDER — FENTANYL CITRATE 50 UG/ML
INJECTION, SOLUTION INTRAMUSCULAR; INTRAVENOUS AS NEEDED
Status: DISCONTINUED | OUTPATIENT
Start: 2025-03-07 | End: 2025-03-07

## 2025-03-07 RX ORDER — MIDAZOLAM HYDROCHLORIDE 1 MG/ML
INJECTION, SOLUTION INTRAMUSCULAR; INTRAVENOUS AS NEEDED
Status: DISCONTINUED | OUTPATIENT
Start: 2025-03-07 | End: 2025-03-07

## 2025-03-07 RX ORDER — GLYCOPYRROLATE 0.2 MG/ML
INJECTION INTRAMUSCULAR; INTRAVENOUS AS NEEDED
Status: DISCONTINUED | OUTPATIENT
Start: 2025-03-07 | End: 2025-03-07

## 2025-03-07 RX ORDER — ONDANSETRON HYDROCHLORIDE 8 MG/1
8 TABLET, FILM COATED ORAL EVERY 8 HOURS PRN
Qty: 20 TABLET | Refills: 0 | Status: SHIPPED | OUTPATIENT
Start: 2025-03-07

## 2025-03-07 RX ORDER — ACETAMINOPHEN 325 MG/1
650 TABLET ORAL EVERY 4 HOURS PRN
Status: DISCONTINUED | OUTPATIENT
Start: 2025-03-07 | End: 2025-03-07 | Stop reason: HOSPADM

## 2025-03-07 RX ORDER — PROCHLORPERAZINE EDISYLATE 5 MG/ML
5 INJECTION INTRAMUSCULAR; INTRAVENOUS ONCE AS NEEDED
Status: DISCONTINUED | OUTPATIENT
Start: 2025-03-07 | End: 2025-03-07 | Stop reason: HOSPADM

## 2025-03-07 RX ORDER — SODIUM CHLORIDE 0.9 G/100ML
INJECTION, SOLUTION IRRIGATION AS NEEDED
Status: DISCONTINUED | OUTPATIENT
Start: 2025-03-07 | End: 2025-03-07 | Stop reason: HOSPADM

## 2025-03-07 RX ORDER — KETAMINE HCL IN NACL, ISO-OSM 100MG/10ML
SYRINGE (ML) INJECTION AS NEEDED
Status: DISCONTINUED | OUTPATIENT
Start: 2025-03-07 | End: 2025-03-07

## 2025-03-07 RX ORDER — ONDANSETRON 4 MG/1
8 TABLET, FILM COATED ORAL EVERY 8 HOURS PRN
Qty: 20 TABLET | Refills: 0 | Status: CANCELLED | OUTPATIENT
Start: 2025-03-07 | End: 2025-03-14

## 2025-03-07 RX ORDER — OXYCODONE AND ACETAMINOPHEN 5; 325 MG/1; MG/1
1 TABLET ORAL EVERY 6 HOURS PRN
Qty: 10 TABLET | Refills: 0 | Status: SHIPPED | OUTPATIENT
Start: 2025-03-07 | End: 2025-03-12

## 2025-03-07 RX ADMIN — PROPOFOL 15 MG: 10 INJECTION, EMULSION INTRAVENOUS at 12:53

## 2025-03-07 RX ADMIN — DEXMEDETOMIDINE HYDROCHLORIDE 8 MCG: 100 INJECTION, SOLUTION INTRAVENOUS at 12:55

## 2025-03-07 RX ADMIN — Medication 5 MG: at 12:59

## 2025-03-07 RX ADMIN — Medication 5 MG: at 12:31

## 2025-03-07 RX ADMIN — PROPOFOL 60 MCG/KG/MIN: 10 INJECTION, EMULSION INTRAVENOUS at 12:32

## 2025-03-07 RX ADMIN — FENTANYL CITRATE 25 MCG: 50 INJECTION, SOLUTION INTRAMUSCULAR; INTRAVENOUS at 12:59

## 2025-03-07 RX ADMIN — ONDANSETRON 4 MG: 2 INJECTION INTRAMUSCULAR; INTRAVENOUS at 13:15

## 2025-03-07 RX ADMIN — CEFAZOLIN SODIUM 2 G: 2 INJECTION, SOLUTION INTRAVENOUS at 12:34

## 2025-03-07 RX ADMIN — MIDAZOLAM 1 MG: 1 INJECTION INTRAMUSCULAR; INTRAVENOUS at 12:30

## 2025-03-07 RX ADMIN — GLYCOPYRROLATE 0.1 MG: 0.2 INJECTION, SOLUTION INTRAMUSCULAR; INTRAVENOUS at 12:30

## 2025-03-07 RX ADMIN — POVIDONE-IODINE 1 APPLICATION: 5 SOLUTION TOPICAL at 11:15

## 2025-03-07 RX ADMIN — DEXMEDETOMIDINE HYDROCHLORIDE 8 MCG: 100 INJECTION, SOLUTION INTRAVENOUS at 12:30

## 2025-03-07 RX ADMIN — PROPOFOL 30 MG: 10 INJECTION, EMULSION INTRAVENOUS at 12:31

## 2025-03-07 RX ADMIN — LIDOCAINE HYDROCHLORIDE 70 MG: 20 INJECTION INTRAVENOUS at 12:30

## 2025-03-07 RX ADMIN — FENTANYL CITRATE 50 MCG: 50 INJECTION, SOLUTION INTRAMUSCULAR; INTRAVENOUS at 12:30

## 2025-03-07 RX ADMIN — SODIUM CHLORIDE, SODIUM LACTATE, POTASSIUM CHLORIDE, AND CALCIUM CHLORIDE: .6; .31; .03; .02 INJECTION, SOLUTION INTRAVENOUS at 12:24

## 2025-03-07 SDOH — HEALTH STABILITY: MENTAL HEALTH: CURRENT SMOKER: 0

## 2025-03-07 ASSESSMENT — PAIN - FUNCTIONAL ASSESSMENT
PAIN_FUNCTIONAL_ASSESSMENT: 0-10

## 2025-03-07 ASSESSMENT — PAIN SCALES - GENERAL
PAINLEVEL_OUTOF10: 0 - NO PAIN

## 2025-03-07 ASSESSMENT — COLUMBIA-SUICIDE SEVERITY RATING SCALE - C-SSRS
6. HAVE YOU EVER DONE ANYTHING, STARTED TO DO ANYTHING, OR PREPARED TO DO ANYTHING TO END YOUR LIFE?: NO
2. HAVE YOU ACTUALLY HAD ANY THOUGHTS OF KILLING YOURSELF?: NO
1. IN THE PAST MONTH, HAVE YOU WISHED YOU WERE DEAD OR WISHED YOU COULD GO TO SLEEP AND NOT WAKE UP?: NO

## 2025-03-07 NOTE — ANESTHESIA POSTPROCEDURE EVALUATION
Patient: Krys Maher    Procedure Summary       Date: 03/07/25 Room / Location: [facility] OR 09 / Virtual [facility]    Anesthesia Start: 1224 Anesthesia Stop: 1336    Procedure: LEFT FOOT DEFORMITY RECONSTRUCTION WITH MINI C-ARM (Left: Foot) Diagnosis:       Tailor's bunionette, left      Pain in toe of left foot      (Left foot M21.622 Bunionette of left foot)      (Pain in left foot)    Surgeons: Luis Armando Martino DPM Responsible Provider: Ja Shaw MD    Anesthesia Type: MAC ASA Status: 3            Anesthesia Type: MAC    Vitals Value Taken Time   /55 03/07/25 1334   Temp 36.3 03/07/25 1336   Pulse 51 03/07/25 1335   Resp 14 03/07/25 1336   SpO2 99 % 03/07/25 1335   Vitals shown include unfiled device data.    Anesthesia Post Evaluation    Patient location during evaluation: PACU  Patient participation: waiting for patient participation  Level of consciousness: responsive to verbal stimuli  Pain management: adequate  Airway patency: patent  Cardiovascular status: acceptable  Respiratory status: acceptable  Hydration status: acceptable  Postoperative Nausea and Vomiting: none      No notable events documented.

## 2025-03-07 NOTE — DISCHARGE INSTRUCTIONS
POST OPERATIVE INSTRUCTIONS    RESTRICTIONS AFTER ANESTHESIA:   - Do not drive, work with heavy equipment or sign legal documents for 24 hours  - Rest at home for 24 hours following anesthesia  - You may experience light-headedness, dizziness, nausea, or sleepiness after surgery.    Stay in bed if you experience these symptoms  - Do not stay alone. A responsible adult must be with you for 24 hours.   - Do not take any alcoholic beverages or sleeping pills for the next 18 hours  - You may experience muscle aches and sore throat  - If you experience difficulty breathing and/or shortness of breath, seek IMMEDIATE    medical attention.     DRESSING: Keep surgical area clean and dry. Do NOT remove dressing. You may notice blood upon your bandage. Bleeding is expected and your bandage may become stained. You may reinforce with gauze or ace bandage.     BATHING: Sponge bath only or use of a cast protector in the shower until first post op visit.    ACTIVITY: Weightbearing as tolerated left lower extremity in post operative shoe  - After discharge from the surgery center, go directly home and limit your activities.  - Keep children and pets away from your operative foot.  - No heavy lifting.  - Wear surgical shoe when walking.    ELEVATION: Elevate the operative site above the level of your heart for at least the next 3 days. This will help decrease pain and prevent swelling. Support the back of your knee with a pillow.     ICE: Use ice pack behind left knee or at left ankle for 20 minutes on and off every hour when awake for the next 3 days. Do NOT fall asleep while using the ice pack.     MEDICATION:   - Some degree of discomfort is to be expected after surgery and will improve gradually as the healing process continues. Pain medication has been prescribed for you.  - [PERCOCET] Recommend taking 1 pill every 6 hours as needed for pain. Can take up to 2 pills every 4 hours. Recommended taking the pain medication as  scheduled for at least the first 24-72 hours following surgery.   - Please take the first dose of pain medication prior to local anesthesia wearing off (the operative site may remain numb anywhere from 4-20 hours after surgery). Set an alarm to take a dose of medication overnight.   - Do not drink alcohol, drive a car, operate any machinery, or work with heavy equipment while taking your prescription pain medication.  - It is common for patients to experience nausea and or vomiting from pain medication. In order to help with this you should take your pain medication with food. Additionally, you have been provided with a prescription for anti-nausea medication which may be taken as prescribed.  - Your pain medication may cause constipation. If you have not had a bowel movement for 3 days or more, you may consider an over-the-counter stool softener such as Milk of Magnesia or Colace.  - You may restart your at home prescriptions.     DIET: You may resume your diet. Advance your diet as tolerated. Keep yourself hydrated.                   POST OPERATIVE INSTRUCTIONS      IF YOU NOTICE:   Fever of 101 degrees or over.  Foul smelling or purulent (pus) drainage from operative site.  Increase in drainage .  Sudden onset of pain that is unrelieved with pain medication.  Observe operative extremity for circulation problems: change in color, coldness, numbness, or tingling.   If any symptoms occur, Call office immediately or after hours emergency number.      PHONE NUMBERS:    Please call [phone] if there are any issues or concerns. You may call this number after hours as well for the doctor on call.    FOLLOW UP APPOINTMENT:    Follow up in 1 week, or sooner if issues arise

## 2025-03-07 NOTE — ANESTHESIA PREPROCEDURE EVALUATION
Patient: Krys Maher    Procedure Information       Date/Time: 03/07/25 1200    Procedure: LEFT FOOT DEFORMITY RECONSTRUCTION WITH MINI C-ARM (Left: Foot) - Anesthesia: LMA WITH Local block    Location: [facility] / [facility]    Surgeons: Luis Armadno Martino DPM            Relevant Problems   Cardiac   (+) Benign essential hypertension      Neuro   (+) Anxiety state   (+) Polyneuropathy      GI   (+) Gastroesophageal reflux disease      Endocrine   (+) Type 2 diabetes mellitus without complication, without long-term current use of insulin (Multi)      Hematology   (+) Anemia      GYN   (+) Malignant neoplasm of breast       Clinical information reviewed:      Problems              NPO Detail:  No data recorded     Physical Exam    Airway  Mallampati: I  TM distance: >3 FB  Neck ROM: full     Cardiovascular - normal exam  Rhythm: regular  Rate: normal     Dental - normal exam     Pulmonary - normal exam     Abdominal            Anesthesia Plan    History of general anesthesia?: yes  History of complications of general anesthesia?: no    ASA 3     MAC     The patient is not a current smoker.  Education provided regarding risk of obstructive sleep apnea.  intravenous induction   Postoperative administration of opioids is intended.  Anesthetic plan and risks discussed with patient.    Plan discussed with CAA.

## 2025-03-07 NOTE — OP NOTE
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]     * Pain in toe of left foot [M79.675]     Procedures  LEFT FOOT DEFORMITY RECONSTRUCTION WITH MINI C-ARM  10575 - [place] OSTEOT W/WO LNGTH SHRT/CORRJ METAR XCP 1ST EA      Surgeons      * Luis Armando Martino - Primary    Resident/Fellow/Other Assistant:  Surgeons and Role:     * Dmitriy Miramontes DPM - Resident - Assisting     * Edgard Huynh DPM - Fellow    Staff:   Circulator: Sindi Lara Person: Ashley    Anesthesia Staff: Anesthesiologist: Ja Shaw MD  C-AA: CINDY Lawrence    Procedure Summary  Anesthesia: Monitor Anesthesia Care  ASA: III  Estimated Blood Loss: 5 mL  Intra-op Medications:   Administrations occurring from 1200 to 1430 on 25:   Medication Name Total Dose   lidocaine (Xylocaine) 20 mg/mL (2 %) injection 10 mL   sodium chloride 0.9 % irrigation solution 1,000 mL   BUPivacaine HCl (Marcaine) 0.5 % (5 mg/mL) injection 10 mL   dexmedeTOMIDine (Precedex) 100 mcg/mL 2 mL single dose vial 16 mcg   fentaNYL (Sublimaze) injection 50 mcg/mL 75 mcg   glycopyrrolate (Robinul) injection 0.1 mg   ketamine injection 10 mg/mL (30 mg/3 mL) prefilled syringe 10 mg   lidocaine (cardiac) injection 2% prefilled syringe 70 mg   midazolam (Versed) injection 1 mg/mL 1 mg   ondansetron (Zofran) 2 mg/mL injection 4 mg   propofol (Diprivan) injection 10 mg/mL 642.77 mg   ceFAZolin (Ancef) 2 g in dextrose (iso)  mL 2 g              Anesthesia Record               Intraprocedure I/O Totals       None           Specimen: No specimens collected              Drains and/or Catheters: * None in log *    Tourniquet Times:     Total Tourniquet  Time Documented:  Calf (Left) - 28 minutes  Total: Calf (Left) - 28 minutes      Implants:     Findings: Dwaynes bunion left foot    Indications: Krys Maher is an 75 y.o. female who is having surgery for Left foot M21.622 Bunionette of left foot  Pain in left foot.     The patient was seen in the preoperative area. The risks, benefits, complications, treatment options, non-operative alternatives, expected recovery and outcomes were discussed with the patient. The possibilities of reaction to medication, pulmonary aspiration, injury to surrounding structures, bleeding, recurrent infection, the need for additional procedures, failure to diagnose a condition, and creating a complication requiring transfusion or operation were discussed with the patient. The patient concurred with the proposed plan, giving informed consent.  The site of surgery was properly noted/marked if necessary per policy. The patient has been actively warmed in preoperative area. Preoperative antibiotics have been ordered and given within 1 hours of incision. Venous thrombosis prophylaxis have been ordered including unilateral sequential compression device    Procedure Details:     PROCEDURAL NOTE  The patient was brought to the operating room and positioned supine on the operating room table.  A time-out was performed, identifying the patient by name, medical record number, date of birth, site of  procedure, and procedure to be performed.  The pt received their scheduled antibiotics and SCDs were placed for DVT prophylaxis.  General anesthesia was induced by the Anesthesiology team.  An oral endotracheal tube was inserted and local anesthesia was administered by the podiatry team to the appropriate extremity using 10 ml's 0.5% marcaine. All bony prominences were well padded. Patient was then secured with safety straps.  Attention was drawn to the left lower extremity. A well padded tourniquet was applied to the left ankle. The area was then  "prepped and draped in the usual sterile fashion. The foot was exsanguinated utilizing esmarch and tournquet was inflated 250 mm Hg    99721 - [place] OSTEOT W/WO LNGTH SHRT/CORRJ METAR XCP 1ST EA  Attention was directed to the left 5th metatarsal head. A skin marker was used to help visualize the dimensions of the 1st metatarsal along the sagittal and transverse plane. Utilizing a 15 blade, A small stab incision along the dorsolateral aspect of the metatarsal neck was performed. The small bur was introduced through the stab incision until a hard stop was noted signifying meeting of bone. With the guidance of fluoro, osteotomy was performed at the neck and head of the 5th metatarsal along the cortical bone of the lateral and dorsolateral borders just proximal to 5th metatarsophalangeal joint. Clinically the tailors bunion deformity had significantly decreased post osteotomy. The bur was removed and the site was exsanguinated to express deposit bone. The surgical site was flushed with saline. Clinically and fluoroscopically, a significant reduction of the tailors bunion deformity was appreciated.  The skin incision sites were closed with 3-0 prolene.     Dressings: betadine soaked adaptic, gauze, ABD, sirisha, 4\" ACE wrap.     The patient tolerated the procedure well, awakened from anesthesia without any difficulties, and was transferred to the patient in stable condition.    POST OP PLAN:  Patient will be discharged home. Patient may weightbear as tolerated in post operative shoe and follow up with Dr. Martino in clinic.       Complications:  None; patient tolerated the procedure well.    Disposition: PACU - hemodynamically stable.  Condition: stable                 Additional Details:     Attending Attestation:     Luis Armando Martino  Phone Number: [phone]      "

## 2025-03-07 NOTE — BRIEF OP NOTE
Date: 3/7/2025  OR Location: [facility]    Name: Krys Maher, : 1950, Age: 75 y.o., MRN: [id number], Sex: female    Diagnosis  Pre-op Diagnosis      * Dwayne's bunionette, left [M21.622]     * Pain in toe of left foot [M79.675] Post-op Diagnosis     * Dwayne's bunionette, left [M21.622]     * Pain in toe of left foot [M79.675]     Procedures  LEFT FOOT DEFORMITY RECONSTRUCTION WITH MINI C-ARM  85148 - [place] OSTEOT W/WO LNGTH SHRT/CORRJ METAR XCP 1ST EA      Surgeons      * Luis Armando Martino - Primary    Resident/Fellow/Other Assistant:  Surgeons and Role:     * Dmitriy Miramontes DPM - Resident - Assisting     * Edgard Huynh DPM - Fellow    Staff:   Circulator: Sindi Lara Person: Ashley    Anesthesia Staff: Anesthesiologist: Ja Shaw MD  C-AA: CINDY Lawrence    Procedure Summary  Anesthesia: Monitor Anesthesia Care  ASA: III  Estimated Blood Loss: 5mL  Intra-op Medications:   Administrations occurring from 1200 to 1430 on 25:   Medication Name Total Dose   lidocaine (Xylocaine) 20 mg/mL (2 %) injection 10 mL   sodium chloride 0.9 % irrigation solution 1,000 mL   BUPivacaine HCl (Marcaine) 0.5 % (5 mg/mL) injection 10 mL   dexmedeTOMIDine (Precedex) 100 mcg/mL 2 mL single dose vial 16 mcg   fentaNYL (Sublimaze) injection 50 mcg/mL 75 mcg   glycopyrrolate (Robinul) injection 0.1 mg   ketamine injection 10 mg/mL (30 mg/3 mL) prefilled syringe 10 mg   lidocaine (cardiac) injection 2% prefilled syringe 70 mg   midazolam (Versed) injection 1 mg/mL 1 mg   ondansetron (Zofran) 2 mg/mL injection 4 mg   propofol (Diprivan) injection 10 mg/mL 309.92 mg   ceFAZolin (Ancef) 2 g in dextrose (iso)  mL 2 g              Anesthesia Record               Intraprocedure I/O Totals       None           Specimen: No specimens collected               Findings: Tailors bunion left foot    Complications:  None; patient tolerated the procedure well.     Disposition: PACU - hemodynamically stable.  Condition:  stable  Specimens Collected: No specimens collected  Attending Attestation:     Luis Armando Martino  Phone Number: [phone]

## (undated) DEVICE — Device

## (undated) DEVICE — NEEDLE, HYPODERMIC, MONOJECT, 27 G X 1.5 IN

## (undated) DEVICE — DRAPE, C-ARM, FOOT SWITCH COVER

## (undated) DEVICE — STRIP, SKIN CLOSURE, STERI STRIP, REINFORCED, 0.5 X 4 IN

## (undated) DEVICE — BANDAGE, COBAN 2, LAYER LITE COMPRESSION, 4 IN, LF

## (undated) DEVICE — CUFF, TOURNIQUET, 18 X 4, DUAL PORT/SNGL BLADDER, DISP, LF

## (undated) DEVICE — HANDLE COVER, LIGHT, RIGID, DISP, LF

## (undated) DEVICE — GOWN, ASTOUND, XL

## (undated) DEVICE — APPLICATOR, CHLORAPREP, W/ORANGE TINT, 26ML